# Patient Record
Sex: FEMALE | Race: WHITE | NOT HISPANIC OR LATINO | Employment: FULL TIME | URBAN - METROPOLITAN AREA
[De-identification: names, ages, dates, MRNs, and addresses within clinical notes are randomized per-mention and may not be internally consistent; named-entity substitution may affect disease eponyms.]

---

## 2017-09-19 ENCOUNTER — TELEPHONE (OUTPATIENT)
Dept: OBSTETRICS AND GYNECOLOGY | Age: 37
End: 2017-09-19

## 2017-09-19 NOTE — TELEPHONE ENCOUNTER
Pt called with rash on both buttocks and upper legs, no vaginal or perianal lesions noted. Pt called here for recommendations. No response with topical hydrocortisone. Pt is not pregnant. Recommend pt see primary or dermatologist. She does have a regular derm and will call them.

## 2017-09-29 ENCOUNTER — OFFICE VISIT (OUTPATIENT)
Dept: OBSTETRICS AND GYNECOLOGY | Age: 37
End: 2017-09-29

## 2017-09-29 VITALS
DIASTOLIC BLOOD PRESSURE: 62 MMHG | HEIGHT: 68 IN | BODY MASS INDEX: 21.82 KG/M2 | SYSTOLIC BLOOD PRESSURE: 102 MMHG | WEIGHT: 144 LBS

## 2017-09-29 DIAGNOSIS — Z13.29 SCREENING FOR THYROID DISORDER: ICD-10-CM

## 2017-09-29 DIAGNOSIS — Z13.0 SCREENING FOR DEFICIENCY ANEMIA: ICD-10-CM

## 2017-09-29 DIAGNOSIS — Z01.419 VISIT FOR GYNECOLOGIC EXAMINATION: Primary | ICD-10-CM

## 2017-09-29 DIAGNOSIS — B37.9 YEAST INFECTION: ICD-10-CM

## 2017-09-29 DIAGNOSIS — N92.0 MENORRHAGIA WITH REGULAR CYCLE: ICD-10-CM

## 2017-09-29 DIAGNOSIS — Z11.51 SCREENING FOR HPV (HUMAN PAPILLOMAVIRUS): ICD-10-CM

## 2017-09-29 PROCEDURE — 99395 PREV VISIT EST AGE 18-39: CPT | Performed by: OBSTETRICS & GYNECOLOGY

## 2017-09-29 RX ORDER — FLUCONAZOLE 150 MG/1
150 TABLET ORAL ONCE
Qty: 2 TABLET | Refills: 0 | Status: SHIPPED | OUTPATIENT
Start: 2017-09-29 | End: 2017-09-29

## 2017-09-29 NOTE — PROGRESS NOTES
Subjective     Chief Complaint   Patient presents with   • Annual Exam     no problems       History of Present Illness    Shabnam Harper is a 37 y.o.  who presents for annual exam.  Her menses are regular every 28-30 days, lasting 10 to 12 days, dysmenorrhea mild, occurring first 1-2 days of flow   Flow is heavy and longer than prior to last pregnancy, she had an episode of spotting between cycles last  but this has not continued  She also notes discharge and vaginal itching  Pt did see primary MD about the rash on her legs/buttocks, she treated with steroids and resolved, she is unsure of source of symptoms  Obstetric History:  OB History      Para Term  AB Living    4 4 3   4    SAB TAB Ectopic Multiple Live Births        4         Menstrual History:     Patient's last menstrual period was 2017.         Current contraception: tubal ligation  History of abnormal Pap smear: no  Received Gardasil immunization: no  Perform regular self breast exam: no  Family history of uterine or ovarian cancer: no  Family History of colon cancer: no  Family history of breast cancer: no    Mammogram: not indicated.  Colonoscopy: not indicated.  DEXA: not indicated.    Exercise: exercises 3 times a week  Calcium/Vitamin D: adequate intake    The following portions of the patient's history were reviewed and updated as appropriate: allergies, current medications, past family history, past medical history, past social history, past surgical history and problem list.    Review of Systems    Review of Systems   Constitutional: Negative for fatigue.   Respiratory: Negative for shortness of breath.    Gastrointestinal: Negative for abdominal pain.   Genitourinary: Negative for dysuria. positive for heavy menses with prolonged bleeding, positive for increased discharge with vaginal itching  Neurological: Negative for headaches.   Psychiatric/Behavioral: Negative for dysphoric mood.         Objective  "  Physical Exam    /62  Ht 68\" (172.7 cm)  Wt 144 lb (65.3 kg)  LMP 09/08/2017  BMI 21.9 kg/m2    General:   alert, appears stated age, cooperative and no distress   Neck: no asymmetry, masses, or scars   Heart: regular rate and rhythm, S1, S2 normal, no murmur, click, rub or gallop   Lungs: clear to auscultation bilaterally   Abdomen: soft, non-tender, without masses or organomegaly   Breast: inspection negative, no nipple discharge or bleeding, no masses or nodularity palpable and no axillary adenopathy   Vulva: normal, Bartholin's, Urethra, [place]'s normal   Vagina: normal mucosa, wet prep done, thick white discharge with small fragment of dark blood   Cervix: no lesions and pap done   Uterus: normal size, mobile, non-tender, normal shape and consistency   Adnexa: normal adnexa and no mass, fullness, tenderness   Rectal: not indicated     Wet prep: pH <5, budding hyphae seen, no clue or trich    Assessment/Plan   Shabnam was seen today for annual exam.    Diagnoses and all orders for this visit:    Visit for gynecologic examination  -     IGP, Apt HPV,rfx 16 / 18,45 - ThinPrep Vial, Cervix    Screening for HPV (human papillomavirus)  -     IGP, Apt HPV,rfx 16 / 18,45 - ThinPrep Vial, Cervix    Menorrhagia with regular cycle  -     Comprehensive Metabolic Panel  -     CBC & Differential    Screening for thyroid disorder  -     TSH    Screening for deficiency anemia  -     CBC & Differential    Yeast infection  -     fluconazole (DIFLUCAN) 150 MG tablet; Take 1 tablet by mouth 1 (One) Time for 1 dose. Repeat in 3 days if needed        All questions answered.  Breast self exam technique reviewed and patient encouraged to perform self-exam monthly.  Discussed healthy lifestyle modifications.  Recommended 30 minutes of aerobic exercise five times per week.    Pt counseled after visit by phone, wet prep shows yeast infection, she would like diflucan so this was sent to pharmacy via [facility] with instructions for " use.    Pt reports some of her friends have had endometrial ablation and that has helped with bleeding. Reviewed this is option but increased risk to procedure with hx of multiple  deliveries. Also discussed progestin treatment like IUD, Mirena ángel given.    Pt will f/u in approx 3 weeks for u/s and embx to evaluate further

## 2017-09-30 LAB
ALBUMIN SERPL-MCNC: 4.5 G/DL (ref 3.5–5.2)
ALBUMIN/GLOB SERPL: 1.8 G/DL
ALP SERPL-CCNC: 37 U/L (ref 39–117)
ALT SERPL-CCNC: 11 U/L (ref 1–33)
AST SERPL-CCNC: 10 U/L (ref 1–32)
BASOPHILS # BLD AUTO: 0.03 10*3/MM3 (ref 0–0.2)
BASOPHILS NFR BLD AUTO: 0.3 % (ref 0–1.5)
BILIRUB SERPL-MCNC: 0.3 MG/DL (ref 0.1–1.2)
BUN SERPL-MCNC: 10 MG/DL (ref 6–20)
BUN/CREAT SERPL: 12.2 (ref 7–25)
CALCIUM SERPL-MCNC: 9.4 MG/DL (ref 8.6–10.5)
CHLORIDE SERPL-SCNC: 102 MMOL/L (ref 98–107)
CO2 SERPL-SCNC: 28.4 MMOL/L (ref 22–29)
CREAT SERPL-MCNC: 0.82 MG/DL (ref 0.57–1)
EOSINOPHIL # BLD AUTO: 0.22 10*3/MM3 (ref 0–0.7)
EOSINOPHIL NFR BLD AUTO: 2.1 % (ref 0.3–6.2)
ERYTHROCYTE [DISTWIDTH] IN BLOOD BY AUTOMATED COUNT: 12.3 % (ref 11.7–13)
GLOBULIN SER CALC-MCNC: 2.5 GM/DL
GLUCOSE SERPL-MCNC: 87 MG/DL (ref 65–99)
HCT VFR BLD AUTO: 38.7 % (ref 35.6–45.5)
HGB BLD-MCNC: 12.7 G/DL (ref 11.9–15.5)
IMM GRANULOCYTES # BLD: 0.03 10*3/MM3 (ref 0–0.03)
IMM GRANULOCYTES NFR BLD: 0.3 % (ref 0–0.5)
LYMPHOCYTES # BLD AUTO: 2.8 10*3/MM3 (ref 0.9–4.8)
LYMPHOCYTES NFR BLD AUTO: 26.8 % (ref 19.6–45.3)
MCH RBC QN AUTO: 31.2 PG (ref 26.9–32)
MCHC RBC AUTO-ENTMCNC: 32.8 G/DL (ref 32.4–36.3)
MCV RBC AUTO: 95.1 FL (ref 80.5–98.2)
MONOCYTES # BLD AUTO: 0.54 10*3/MM3 (ref 0.2–1.2)
MONOCYTES NFR BLD AUTO: 5.2 % (ref 5–12)
NEUTROPHILS # BLD AUTO: 6.83 10*3/MM3 (ref 1.9–8.1)
NEUTROPHILS NFR BLD AUTO: 65.3 % (ref 42.7–76)
PLATELET # BLD AUTO: 325 10*3/MM3 (ref 140–500)
POTASSIUM SERPL-SCNC: 4.4 MMOL/L (ref 3.5–5.2)
PROT SERPL-MCNC: 7 G/DL (ref 6–8.5)
RBC # BLD AUTO: 4.07 10*6/MM3 (ref 3.9–5.2)
SODIUM SERPL-SCNC: 140 MMOL/L (ref 136–145)
TSH SERPL DL<=0.005 MIU/L-ACNC: 2.02 MIU/ML (ref 0.27–4.2)
WBC # BLD AUTO: 10.45 10*3/MM3 (ref 4.5–10.7)

## 2017-10-02 ENCOUNTER — TELEPHONE (OUTPATIENT)
Dept: OBSTETRICS AND GYNECOLOGY | Age: 37
End: 2017-10-02

## 2017-10-02 NOTE — TELEPHONE ENCOUNTER
----- Message from Jessica Kellogg MD sent at 10/1/2017  3:30 PM EDT -----  Reviewed bloodwork, alk phos minimally decreased, rest of cbc,cmp and thryoid labs normal, please call pt with results

## 2017-10-03 ENCOUNTER — TELEPHONE (OUTPATIENT)
Dept: OBSTETRICS AND GYNECOLOGY | Age: 37
End: 2017-10-03

## 2017-10-03 DIAGNOSIS — N39.0 URINARY TRACT INFECTION WITHOUT HEMATURIA, SITE UNSPECIFIED: Primary | ICD-10-CM

## 2017-10-03 RX ORDER — NITROFURANTOIN 25; 75 MG/1; MG/1
100 CAPSULE ORAL 2 TIMES DAILY
Qty: 10 CAPSULE | Refills: 0 | Status: SHIPPED | OUTPATIENT
Start: 2017-10-03 | End: 2017-10-08

## 2017-10-03 NOTE — TELEPHONE ENCOUNTER
Pt with UTI symptoms, she was treated for UTI at urgent care about 3 to 4 months ago but no symptoms since treatment until yesterday. She had cipro and symptoms improved. Now with similar feelings of low abdominal pain and urinary frequency. She denies fever or back pain. No dysuria.     She did take diflucan for yeast and that is improving.     Offered evaluation and urine culture but pt cannot come in. She desires to proceed with treatment now. Instructed on medication. Will use macrobid 100 mg twice daily for 5 days.Pt denies allergies

## 2017-10-03 NOTE — TELEPHONE ENCOUNTER
Pt was seen in office on 09-29-17. Pt states she was treated for a yeast infection at the appt, but now think she has a bladder infection. Pt states she is having frequency and central lower abdominal pain. Pt denies fever, burning, and blood in urine. Please advise.     KATIE  Pt#: [phone]  Pharm in chart

## 2017-10-05 LAB
CYTOLOGIST CVX/VAG CYTO: NORMAL
CYTOLOGY CVX/VAG DOC THIN PREP: NORMAL
DX ICD CODE: NORMAL
HIV 1 & 2 AB SER-IMP: NORMAL
HPV I/H RISK 4 DNA CVX QL PROBE+SIG AMP: NEGATIVE
OTHER STN SPEC: NORMAL
PATH REPORT.FINAL DX SPEC: NORMAL
STAT OF ADQ CVX/VAG CYTO-IMP: NORMAL

## 2017-10-09 ENCOUNTER — TELEPHONE (OUTPATIENT)
Dept: OBSTETRICS AND GYNECOLOGY | Age: 37
End: 2017-10-09

## 2017-10-09 NOTE — TELEPHONE ENCOUNTER
----- Message from Jessica Kellogg MD sent at 10/8/2017  7:46 AM EDT -----  Call pt, pap and hpv are negative

## 2017-10-23 ENCOUNTER — PROCEDURE VISIT (OUTPATIENT)
Dept: OBSTETRICS AND GYNECOLOGY | Age: 37
End: 2017-10-23

## 2017-10-23 ENCOUNTER — OFFICE VISIT (OUTPATIENT)
Dept: OBSTETRICS AND GYNECOLOGY | Age: 37
End: 2017-10-23

## 2017-10-23 VITALS
DIASTOLIC BLOOD PRESSURE: 62 MMHG | WEIGHT: 145 LBS | SYSTOLIC BLOOD PRESSURE: 110 MMHG | HEIGHT: 68 IN | BODY MASS INDEX: 21.98 KG/M2

## 2017-10-23 DIAGNOSIS — N92.0 MENORRHAGIA WITH REGULAR CYCLE: Primary | ICD-10-CM

## 2017-10-23 DIAGNOSIS — N92.0 MENORRHAGIA WITH REGULAR CYCLE: ICD-10-CM

## 2017-10-23 DIAGNOSIS — Z01.812 PRE-PROCEDURE LAB EXAM: Primary | ICD-10-CM

## 2017-10-23 LAB
B-HCG UR QL: NEGATIVE
INTERNAL NEGATIVE CONTROL: NEGATIVE
INTERNAL POSITIVE CONTROL: POSITIVE
Lab: NORMAL

## 2017-10-23 PROCEDURE — 81025 URINE PREGNANCY TEST: CPT | Performed by: OBSTETRICS & GYNECOLOGY

## 2017-10-23 PROCEDURE — 99212 OFFICE O/P EST SF 10 MIN: CPT | Performed by: OBSTETRICS & GYNECOLOGY

## 2017-10-23 PROCEDURE — 76830 TRANSVAGINAL US NON-OB: CPT | Performed by: OBSTETRICS & GYNECOLOGY

## 2017-10-23 PROCEDURE — 58100 BIOPSY OF UTERUS LINING: CPT | Performed by: OBSTETRICS & GYNECOLOGY

## 2017-10-23 NOTE — PROGRESS NOTES
"Chief complaint:menorrhagia     HPI  Shabnam Harper is a 37 y.o. female. Pt with increasing flow and cramping with cycles. No irregular bleeding.        The following portions of the patient's history were reviewed and updated as appropriate: allergies, current medications, past family history, past medical history, past social history, past surgical history and problem list.    Review of Systems  Pertinent items are noted in HPI.    /62  Ht 68\" (172.7 cm)  Wt 145 lb (65.8 kg)  LMP 10/14/2017  BMI 22.05 kg/m2    Objective   Physical Exam   Constitutional: She appears well-developed and well-nourished.     tv u/s: uterus with small fibroid on anterior wall, intramural, endometrium appears normal without focal lesions, 1.4 X 1.4 cm, normal ovaries with small follicles  Assessment/Plan   Shabnam was seen today for follow-up.    Diagnoses and all orders for this visit:    Pre-procedure lab exam  -     POC Pregnancy, Urine    Menorrhagia with regular cycle  -     Reference Histopathology - Tissue, Uterus      Endometrial Biopsy Procedure Note    Pre-operative Diagnosis: menorrhagea    Post-operative Diagnosis:menorrhagia    Indications: menorrhagia    Procedure Details    Urine pregnancy test was done today and result was negative.  The risks (including infection, bleeding, pain, and uterine perforation) and benefits of the procedure were explained to the patient and verbal and written informed consent was obtained.       The patient was placed in the dorsal lithotomy position. The speculum inserted in the vagina, and the cervix prepped with povidone iodine X 3.      A sharp tenaculum was applied to the anterior lip of the cervix for stabilization.  A sterile pipelle was used to sound the uterus to a depth of 8.5cm.  The Pipelle endometrial aspirator was used to sample the endometrium.  Sample was sent for pathologic examination.    Condition:  Stable    Complications:  None    Plan:    The patient was " advised to call for any fever or for prolonged or severe pain or bleeding.   Reviewed options again for management of bleeding including IUD or low dose pill. Ablation discussed but pt cautioned given history of 4  deliveries. Will call with biopsy and pt to consider options for management.

## 2017-10-25 LAB
DX ICD CODE: NORMAL
DX ICD CODE: NORMAL
PATH REPORT.FINAL DX SPEC: NORMAL
PATH REPORT.GROSS SPEC: NORMAL
PATH REPORT.RELEVANT HX SPEC: NORMAL
PATH REPORT.SITE OF ORIGIN SPEC: NORMAL
PATHOLOGIST NAME: NORMAL
PAYMENT PROCEDURE: NORMAL

## 2017-10-29 ENCOUNTER — TELEPHONE (OUTPATIENT)
Dept: OBSTETRICS AND GYNECOLOGY | Age: 37
End: 2017-10-29

## 2017-10-29 DIAGNOSIS — N92.0 MENORRHAGIA WITH REGULAR CYCLE: Primary | ICD-10-CM

## 2017-10-30 ENCOUNTER — TELEPHONE (OUTPATIENT)
Dept: OBSTETRICS AND GYNECOLOGY | Age: 37
End: 2017-10-30

## 2017-10-30 NOTE — TELEPHONE ENCOUNTER
----- Message from Jessica Kellogg MD sent at 10/29/2017  8:47 PM EDT -----  Called pt and left message to call for results  Inform pt, results benign but disordered proliferative endometrium suggests she may have irregular ovulation, usually this responds to progestin therapy

## 2017-10-30 NOTE — TELEPHONE ENCOUNTER
Pt called MD back after call and reviewed biopsy. Pt wants to try combined ocp's. She denies HTN or migraines or dvt/pe. Reviewed risks to include DVt/PE/CVE/HTN and gallbladder disease and pt desires. Sent rx via epic and reviewed instructions with pt.

## 2017-10-30 NOTE — TELEPHONE ENCOUNTER
"Called pt to review results, no answer, left message to call tomorrow for results, \"nothing urgent\" noted  "

## 2018-11-09 ENCOUNTER — TELEPHONE (OUTPATIENT)
Dept: OBSTETRICS AND GYNECOLOGY | Age: 38
End: 2018-11-09

## 2018-11-09 ENCOUNTER — OFFICE VISIT (OUTPATIENT)
Dept: OBSTETRICS AND GYNECOLOGY | Age: 38
End: 2018-11-09

## 2018-11-09 VITALS
BODY MASS INDEX: 22.73 KG/M2 | SYSTOLIC BLOOD PRESSURE: 120 MMHG | DIASTOLIC BLOOD PRESSURE: 74 MMHG | HEIGHT: 68 IN | WEIGHT: 150 LBS

## 2018-11-09 DIAGNOSIS — Z77.21 EXPOSURE TO BLOOD OR BODY FLUID: ICD-10-CM

## 2018-11-09 DIAGNOSIS — R35.0 URINARY FREQUENCY: Primary | ICD-10-CM

## 2018-11-09 LAB
BILIRUB BLD-MCNC: NEGATIVE MG/DL
CLARITY, POC: CLEAR
COLOR UR: YELLOW
GLUCOSE UR STRIP-MCNC: NEGATIVE MG/DL
KETONES UR QL: NEGATIVE
LEUKOCYTE EST, POC: NEGATIVE
NITRITE UR-MCNC: NEGATIVE MG/ML
PH UR: 6 [PH] (ref 5–8)
PROT UR STRIP-MCNC: NEGATIVE MG/DL
RBC # UR STRIP: ABNORMAL /UL
SP GR UR: 1.01 (ref 1–1.03)
UROBILINOGEN UR QL: NORMAL

## 2018-11-09 PROCEDURE — 99213 OFFICE O/P EST LOW 20 MIN: CPT | Performed by: NURSE PRACTITIONER

## 2018-11-09 PROCEDURE — 81002 URINALYSIS NONAUTO W/O SCOPE: CPT | Performed by: NURSE PRACTITIONER

## 2018-11-09 RX ORDER — CIPROFLOXACIN 250 MG/1
250 TABLET, FILM COATED ORAL 2 TIMES DAILY
Qty: 12 TABLET | Refills: 0 | Status: SHIPPED | OUTPATIENT
Start: 2018-11-09 | End: 2018-11-15

## 2018-11-09 RX ORDER — SULFAMETHOXAZOLE AND TRIMETHOPRIM 800; 160 MG/1; MG/1
1 TABLET ORAL
COMMUNITY
Start: 2018-11-05 | End: 2018-11-10

## 2018-11-09 NOTE — PROGRESS NOTES
"Subjective   Shabnam Harper is a 38 y.o. female is being seen today for   Chief Complaint   Patient presents with   • Urinary Frequency     Pt c/o urinary burning, frequency and urgency. Went to urgent care Monday and was given an antibiotic but doesn't feel any better.    .    History of Present Illness     Patient here with urinary burning, frequency and urgency.  She has also had a new partner and requests STD testing.  She was seen in an urgent care of Monday for possible UTI and was given Bactrim but symptoms are still there, although they are somewhat improved.  She has taken Cipro for UTIs in the past and symptoms were generally gone in a few days.  She denies any flank pain, nausea or fever.  It does not appear that they sent a urine culture on Monday and she has not received a follow up phone call from them since the visit. She denies any other vaginal symptoms, discharge, odor or itching.  She is on her period currently.    The following portions of the patient's history were reviewed and updated as appropriate: allergies, current medications, past family history, past medical history, past social history, past surgical history and problem list.    /74   Ht 172.7 cm (68\")   Wt 68 kg (150 lb)   LMP 11/05/2018   BMI 22.81 kg/m²         Review of Systems   Constitutional: Negative.  Negative for fever.   HENT: Negative.    Eyes: Negative.    Respiratory: Negative.    Cardiovascular: Negative.    Gastrointestinal: Negative.    Endocrine: Negative.    Genitourinary: Positive for dysuria, frequency, hematuria and urgency. Negative for flank pain, menstrual problem, vaginal discharge and vaginal pain.   Musculoskeletal: Negative.    Skin: Negative.    Allergic/Immunologic: Negative.    Neurological: Negative.    Hematological: Negative.    Psychiatric/Behavioral: Negative.        Objective   Physical Exam   Constitutional: She is oriented to person, place, and time. She appears well-developed and " well-nourished.   Genitourinary: Vagina normal and uterus normal. Uterus is not tender. Cervix exhibits no motion tenderness, no discharge and no friability.   Neurological: She is alert and oriented to person, place, and time.   Skin: Skin is warm and dry.   Psychiatric: She has a normal mood and affect.         Assessment/Plan   Shabnam was seen today for urinary frequency.    Diagnoses and all orders for this visit:    Urinary frequency  -     POC Urinalysis Dipstick  -     Urine Culture - Urine, Urine, Clean Catch  -     RPR  -     Hepatitis B surface antigen  -     Hepatitis C antibody  -     HIV-1 / O / 2 Ag / Antibody 4th Generation    Exposure to blood or body fluid  -     NuSwab VG+ - Swab, Vagina    Other orders  -     ciprofloxacin (CIPRO) 250 MG tablet; Take 1 tablet by mouth 2 (Two) Times a Day for 6 days.        Patient requests to change to Cipro (she has taken in the past with success).  Reviewed UA which is negative currently other than blood but she is on her cycle. Exam is normal.  Will send STD panel and urine culture.  She will call or seek further evaluation with increasing symptoms or fever.

## 2018-11-09 NOTE — TELEPHONE ENCOUNTER
Pt went to immediate care on Monday for bladder infection, was given antibiotic and doesn't feel better. Pt still c/o burning, frequency, urgency. Pt wants to be seen today. Pt said she is okay with NP or PA

## 2018-11-10 LAB
HBV SURFACE AG SERPL QL IA: NEGATIVE
HCV AB S/CO SERPL IA: 0.2 S/CO RATIO (ref 0–0.9)
HIV 1+2 AB+HIV1 P24 AG SERPL QL IA: NON REACTIVE
RPR SER QL: NORMAL

## 2018-11-11 LAB
BACTERIA UR CULT: NO GROWTH
BACTERIA UR CULT: NORMAL

## 2018-11-12 ENCOUNTER — TELEPHONE (OUTPATIENT)
Dept: OBSTETRICS AND GYNECOLOGY | Age: 38
End: 2018-11-12

## 2018-11-12 NOTE — PROGRESS NOTES
I have reviewed the notes, assessments, and/or procedures performed by HILARY Moreland, I concur with her/his documentation of Shabnam Harper.

## 2018-11-12 NOTE — TELEPHONE ENCOUNTER
----- Message from DANICA Basilio sent at 11/12/2018 12:29 PM EST -----  Let her know her serum std results are neg as is her urine culture

## 2018-11-14 LAB
A VAGINAE DNA VAG QL NAA+PROBE: NORMAL SCORE
BVAB2 DNA VAG QL NAA+PROBE: NORMAL SCORE
C ALBICANS DNA VAG QL NAA+PROBE: NEGATIVE
C GLABRATA DNA VAG QL NAA+PROBE: NEGATIVE
C TRACH RRNA SPEC QL NAA+PROBE: NEGATIVE
MEGA1 DNA VAG QL NAA+PROBE: NORMAL SCORE
N GONORRHOEA RRNA SPEC QL NAA+PROBE: NEGATIVE
T VAGINALIS RRNA SPEC QL NAA+PROBE: NEGATIVE

## 2019-04-03 ENCOUNTER — APPOINTMENT (OUTPATIENT)
Dept: GENERAL RADIOLOGY | Facility: HOSPITAL | Age: 39
End: 2019-04-03

## 2019-04-03 ENCOUNTER — HOSPITAL ENCOUNTER (EMERGENCY)
Facility: HOSPITAL | Age: 39
Discharge: HOME | End: 2019-04-03
Attending: EMERGENCY MEDICINE | Admitting: EMERGENCY MEDICINE

## 2019-04-03 VITALS
OXYGEN SATURATION: 100 % | BODY MASS INDEX: 21.82 KG/M2 | HEIGHT: 68 IN | SYSTOLIC BLOOD PRESSURE: 111 MMHG | RESPIRATION RATE: 16 BRPM | TEMPERATURE: 98.4 F | DIASTOLIC BLOOD PRESSURE: 92 MMHG | HEART RATE: 79 BPM | WEIGHT: 144 LBS

## 2019-04-03 DIAGNOSIS — S61.314A LACERATION OF RIGHT RING FINGER WITHOUT FOREIGN BODY WITH DAMAGE TO NAIL, INITIAL ENCOUNTER: Primary | ICD-10-CM

## 2019-04-03 PROCEDURE — 25010000002 TDAP 5-2.5-18.5 LF-MCG/0.5 SUSPENSION: Performed by: EMERGENCY MEDICINE

## 2019-04-03 PROCEDURE — 90715 TDAP VACCINE 7 YRS/> IM: CPT | Performed by: EMERGENCY MEDICINE

## 2019-04-03 PROCEDURE — 90471 IMMUNIZATION ADMIN: CPT | Performed by: EMERGENCY MEDICINE

## 2019-04-03 PROCEDURE — 73130 X-RAY EXAM OF HAND: CPT

## 2019-04-03 PROCEDURE — 99282 EMERGENCY DEPT VISIT SF MDM: CPT

## 2019-04-03 RX ADMIN — TETANUS TOXOID, REDUCED DIPHTHERIA TOXOID AND ACELLULAR PERTUSSIS VACCINE, ADSORBED 0.5 ML: 5; 2.5; 8; 8; 2.5 SUSPENSION INTRAMUSCULAR at 09:03

## 2019-04-03 NOTE — ED NOTES
2x2 and kerlex simple dressing applied to right ring finger     Cori Murillo RN  04/03/19 1020       Cori Murillo RN  04/03/19 1021

## 2019-04-03 NOTE — ED NOTES
Patient reports she slammed her right ring finger in her car door.     Charley Harding, RN  04/03/19 0806

## 2019-04-03 NOTE — ED PROVIDER NOTES
" EMERGENCY DEPARTMENT ENCOUNTER    CHIEF COMPLAINT  Chief Complaint: finger injury  History given by: patient  History limited by: none  Room Number:   PMD: Petey Boland MD      HPI:  Pt is a 38 y.o. female who presents complaining of injury to the 4th digit of her R hand that started earlier this morning when she slammed the finger in a car door. Pt states that her finger was numb on the way to the ER, but describes the pain as \"achey\" currently. Pt thinks that she slammed her entire finger in the car door, not just the fingertip. Pt states that her tetanus shot is not up to date.     Duration: since earlier this morning  Onset: sudden  Timing: brief  Location: 4th digit of the R hand  Radiation: none  Quality: finger injury  Intensity/Severity: moderate  Progression: unchanged  Associated Symptoms: none  Aggravating Factors: none  Alleviating Factors: none  Previous Episodes: none  Treatment before arrival: none    PAST MEDICAL HISTORY  Active Ambulatory Problems     Diagnosis Date Noted   • No Active Ambulatory Problems     Resolved Ambulatory Problems     Diagnosis Date Noted   • No Resolved Ambulatory Problems     Past Medical History:   Diagnosis Date   • ADHD (attention deficit hyperactivity disorder)        PAST SURGICAL HISTORY  Past Surgical History:   Procedure Laterality Date   •  SECTION     • TUBAL ABDOMINAL LIGATION         FAMILY HISTORY  Family History   Problem Relation Age of Onset   • Hypertension Mother    • Hypertension Maternal Grandmother    • Diabetes Maternal Grandfather    • Breast cancer Neg Hx    • Ovarian cancer Neg Hx    • Uterine cancer Neg Hx    • Colon cancer Neg Hx    • Melanoma Neg Hx    • Prostate cancer Neg Hx        SOCIAL HISTORY  Social History     Socioeconomic History   • Marital status:      Spouse name: Not on file   • Number of children: Not on file   • Years of education: Not on file   • Highest education level: Not on file   Tobacco Use   • " "Smoking status: Never Smoker   Substance and Sexual Activity   • Alcohol use: Yes     Comment: occ   • Drug use: No   • Sexual activity: Yes     Partners: Male     Birth control/protection: Surgical     Comment: Tubes removed       ALLERGIES  Patient has no known allergies.    REVIEW OF SYSTEMS  Review of Systems   Musculoskeletal:        Pt c/o injury and \"achey\" pain to the 4th digit of the R hand.    Neurological: Positive for numbness (injured finger was numb on the way to the ER ).     All other review of systems negative unless otherwise stated above.     PHYSICAL EXAM  ED Triage Vitals [04/03/19 0807]   Temp Heart Rate Resp BP SpO2   98.4 °F (36.9 °C) 79 16 -- 100 %      Temp src Heart Rate Source Patient Position BP Location FiO2 (%)   Tympanic -- -- -- --       Physical Exam   Constitutional: She is oriented to person, place, and time.  Non-toxic appearance. No distress.   Eyes: EOM are normal.   Neck: Normal range of motion.   Cardiovascular: Normal rate and regular rhythm.   Pulmonary/Chest: Effort normal and breath sounds normal. No respiratory distress.   Symmetric, non-labored breathing.   Musculoskeletal:   To the 4th digit of the R hand, there is a laceration to the distal fingertip just below the distal fingernail with the radial aspet having been displaced deep into the fingertip. Flexion and extension of the distal finger is intact. No active bleeding.    Neurological: She is alert and oriented to person, place, and time. She has normal sensation and normal strength.   Pt's sensation of the R 4th digit is intact.    Skin: Skin is warm and dry.   Psychiatric: Affect normal.   Nursing note and vitals reviewed.      RADIOLOGY  XR Hand 3+ View Right   Final Result   3 views of the right hand demonstrate no bony or articular abnormality.  There is no evidence of fracture or subluxation.        I ordered the above noted radiological studies. Interpreted by radiologist. Reviewed by me in PACS. "       PROCEDURES  Procedures      PROGRESS AND CONSULTS     0809 XR R Hand ordered for further evaluation.     0846 Tdap ordered for pt's laceration.     0945 I spoke with Dr. Arreola and discussed the pt's case. Dr. Arreola will see the pt in office.     1002 Rechecked pt. Pt is resting comfortably. Notified pt of results of XR R Hand (negative for fx). I told the pt that I spoke with Dr. Arreola and that he will see the pt in office immediately after leaving ED today. Discussed the plan to discharge the pt. I instructed the pt to follow up with Dr. Arreola in office. Pt understands and agrees with the plan, all questions answered.    MEDICAL DECISION MAKING  Results were reviewed/discussed with the patient and they were also made aware of online access. Pt also made aware that some labs, such as cultures, will not be resulted during ER visit and follow up with PMD is necessary.     MDM  Number of Diagnoses or Management Options  Laceration of right ring finger without foreign body with damage to nail, initial encounter:      Amount and/or Complexity of Data Reviewed  Tests in the radiology section of CPT®: reviewed and ordered (XR R Hand - negative acute)  Discuss the patient with other providers: yes (Dr. Arreola)  Independent visualization of images, tracings, or specimens: yes           DIAGNOSIS  Final diagnoses:   Laceration of right ring finger without foreign body with damage to nail, initial encounter       DISPOSITION  DISCHARGE    Patient discharged in stable condition.    Reviewed implications of results, diagnosis, meds, responsibility to follow up, warning signs and symptoms of possible worsening, potential complications and reasons to return to ER.    Patient/Family voiced understanding of above instructions.    Discussed plan for discharge, as there is no emergent indication for admission. Patient referred to primary care provider for BP management due to today's BP. Pt/family is agreeable and understands need for  follow up and repeat testing.  Pt is aware that discharge does not mean that nothing is wrong but it indicates no emergency is present that requires admission and they must continue care with follow-up as given below or physician of their choice.     FOLLOW-UP  No follow-up provider specified.       Medication List      No changes were made to your prescriptions during this visit.           Latest Documented Vital Signs:  As of 10:27 AM  BP- 111/92 HR- 79 Temp- 98.4 °F (36.9 °C) (Tympanic) O2 sat- 100%    --  Documentation assistance provided by amber Kilpatrick for Dr. Aguirre.  Information recorded by the scribe was done at my direction and has been verified and validated by me.                 Cj Kilpatrick  04/03/19 1031       Wayne Aguirre MD  04/04/19 9909

## 2019-04-03 NOTE — DISCHARGE INSTRUCTIONS
Dr. Arreola will see you in his office now. His address is:    [address] [place] [address], [address] [place] [id number] [phone]

## 2019-07-18 ENCOUNTER — OFFICE VISIT (OUTPATIENT)
Dept: OBSTETRICS AND GYNECOLOGY | Age: 39
End: 2019-07-18

## 2019-07-18 VITALS
BODY MASS INDEX: 22.43 KG/M2 | HEIGHT: 68 IN | WEIGHT: 148 LBS | DIASTOLIC BLOOD PRESSURE: 78 MMHG | SYSTOLIC BLOOD PRESSURE: 100 MMHG

## 2019-07-18 DIAGNOSIS — R39.9 UTI SYMPTOMS: Primary | ICD-10-CM

## 2019-07-18 DIAGNOSIS — Z77.21 EXPOSURE TO BLOOD OR BODY FLUID: ICD-10-CM

## 2019-07-18 DIAGNOSIS — N76.0 ACUTE VAGINITIS: ICD-10-CM

## 2019-07-18 LAB
BILIRUB BLD-MCNC: NEGATIVE MG/DL
GLUCOSE UR STRIP-MCNC: NEGATIVE MG/DL
KETONES UR QL: NEGATIVE
LEUKOCYTE EST, POC: NEGATIVE
NITRITE UR-MCNC: NEGATIVE MG/ML
PH UR: 7 [PH] (ref 5–8)
PROT UR STRIP-MCNC: NEGATIVE MG/DL
RBC # UR STRIP: ABNORMAL /UL
SP GR UR: 1.01 (ref 1–1.03)
UROBILINOGEN UR QL: NORMAL

## 2019-07-18 PROCEDURE — 99213 OFFICE O/P EST LOW 20 MIN: CPT | Performed by: NURSE PRACTITIONER

## 2019-07-18 PROCEDURE — 81002 URINALYSIS NONAUTO W/O SCOPE: CPT | Performed by: NURSE PRACTITIONER

## 2019-07-18 RX ORDER — SULFAMETHOXAZOLE AND TRIMETHOPRIM 800; 160 MG/1; MG/1
1 TABLET ORAL 2 TIMES DAILY
Qty: 6 TABLET | Refills: 0 | Status: SHIPPED | OUTPATIENT
Start: 2019-07-18 | End: 2019-11-25

## 2019-07-18 NOTE — PROGRESS NOTES
"Subjective   Shabnam Harper is a 38 y.o. female is being seen today for   Chief Complaint   Patient presents with   • Gynecologic Exam     PT HERE FOR POSSIBLE UTI OR YEAST INF (SEE U/A RESULTS). SHE IS OTHERWISE WELL.    .    History of Present Illness     Patient complaining of some lower abdominal \"burning\" and vaginal itching  She was in a bathing suit all weekend and in a hot tub and was concerned about a possible bladder or vaginal infection  She has a history of UTIs and seems similar but isn't having specific pain with urination, it is more constant  Some urination frequency as well  She would like STD testing as well, she has had a new partner. Declines serum testing  No back pain or fevers      The following portions of the patient's history were reviewed and updated as appropriate: allergies, current medications, past family history, past medical history, past social history, past surgical history and problem list.    /78   Ht 172.7 cm (68\")   Wt 67.1 kg (148 lb)   LMP 07/04/2019   BMI 22.50 kg/m²         Review of Systems   Constitutional: Negative.    HENT: Negative.    Eyes: Negative.    Respiratory: Negative.    Cardiovascular: Negative.    Gastrointestinal: Negative.    Endocrine: Negative.    Genitourinary: Positive for frequency, pelvic pain and vaginal pain. Negative for difficulty urinating, dysuria, vaginal bleeding and vaginal discharge.   Musculoskeletal: Negative.    Skin: Negative.    Allergic/Immunologic: Negative.    Neurological: Negative.    Hematological: Negative.    Psychiatric/Behavioral: Negative.        Objective   Physical Exam   Constitutional: She is oriented to person, place, and time. She appears well-developed and well-nourished.   Genitourinary: Vagina normal and uterus normal. Uterus is not tender. Cervix exhibits no motion tenderness, no discharge and no friability.   Genitourinary Comments: yellow, thin discharge noted.  No erythema.     Neurological: She is " alert and oriented to person, place, and time.   Skin: Skin is warm and dry.   Psychiatric: She has a normal mood and affect.         Assessment/Plan   Shabnam was seen today for gynecologic exam.    Diagnoses and all orders for this visit:    UTI symptoms  -     POC Urinalysis Dipstick  -     Urine Culture - Urine, Urine, Clean Catch  -     NuSwab VG+ - Swab, Vagina    Acute vaginitis  -     NuSwab VG+ - Swab, Vagina    Exposure to blood or body fluid    Other orders  -     sulfamethoxazole-trimethoprim (BACTRIM DS) 800-160 MG per tablet; Take 1 tablet by mouth 2 (Two) Times a Day.      Patient would like to start abx prior to weekend in case symptoms worsen.  We discussed going to urgent care if her symptoms drastically worsen or she develops a fever.  Will check vaginal culture and urine culture and call with results.  Encouraged condoms for STD protection.

## 2019-07-20 LAB
BACTERIA UR CULT: NORMAL
BACTERIA UR CULT: NORMAL

## 2019-11-12 ENCOUNTER — OFFICE VISIT (OUTPATIENT)
Dept: OBSTETRICS AND GYNECOLOGY | Age: 39
End: 2019-11-12

## 2019-11-12 VITALS
WEIGHT: 149.2 LBS | DIASTOLIC BLOOD PRESSURE: 60 MMHG | SYSTOLIC BLOOD PRESSURE: 102 MMHG | HEIGHT: 68 IN | BODY MASS INDEX: 22.61 KG/M2

## 2019-11-12 DIAGNOSIS — N94.9 VAGINAL BURNING: Primary | ICD-10-CM

## 2019-11-12 PROCEDURE — 99213 OFFICE O/P EST LOW 20 MIN: CPT | Performed by: OBSTETRICS & GYNECOLOGY

## 2019-11-12 RX ORDER — NITROFURANTOIN 25; 75 MG/1; MG/1
100 CAPSULE ORAL 2 TIMES DAILY
COMMUNITY
End: 2019-11-25

## 2019-11-12 NOTE — PROGRESS NOTES
"Chief complaint:     HPI  Shabnam Harper is a 39 y.o. female comes in for evaluation of possible UTI vs vaginitis. She has vaginal burning. She denies abnormal discharge but notes some discharge. She denies itching or odor. She went to urgent care and was treated with macrobid. They called her with results and her ucx and screens for CZ/GC and trich were negative as well. She also did 3 day monistat last week and did not note improvement in symptoms.        The following portions of the patient's history were reviewed and updated as appropriate: allergies, current medications, past family history, past medical history, past social history, past surgical history and problem list.    Review of Systems  Constitutional: positive for chills, negative for fevers  Gastrointestinal: negative  Genitourinary:pos for urinary frequency and dysuria, neg for vaginal bleeding, positive for vaginal burning    /60   Ht 172.7 cm (68\")   Wt 67.7 kg (149 lb 3.2 oz)   LMP 10/20/2019 (Exact Date)   Breastfeeding? No   BMI 22.69 kg/m²         Physical Exam   Constitutional: She is oriented to person, place, and time. She appears well-developed and well-nourished.   HENT:   Head: Normocephalic and atraumatic.   Pulmonary/Chest: Effort normal.   Abdominal: Soft. There is no tenderness.   Genitourinary:   Genitourinary Comments: Normal external female genitalia. No lesions noted.  Vagina with small white discharge, no focal lesions. Cervix without lesions or CMT. Uterine and adnexa without tenderness.   Neurological: She is alert and oriented to person, place, and time.   Skin: Skin is warm and dry.   Psychiatric: She has a normal mood and affect. Her behavior is normal.           Shabnam was seen today for follow-up.    Diagnoses and all orders for this visit:    Vaginal burning  -     NuSwab VG, HSV      Non-specific symptoms in patient with negative ucx and no response with course of macrobid and monistat. Will check " vaginitis swab with HSV. Discussed potential for HSV prodrome vs atypical yeast. Pt advised to call if symptoms worsen or with fever.     Pt due for annual. Will schedule next available.

## 2019-11-15 ENCOUNTER — TELEPHONE (OUTPATIENT)
Dept: OBSTETRICS AND GYNECOLOGY | Age: 39
End: 2019-11-15

## 2019-11-15 LAB
A VAGINAE DNA VAG QL NAA+PROBE: NORMAL SCORE
BVAB2 DNA VAG QL NAA+PROBE: NORMAL SCORE
C ALBICANS DNA VAG QL NAA+PROBE: NEGATIVE
C GLABRATA DNA VAG QL NAA+PROBE: NEGATIVE
HSV1 DNA SPEC QL NAA+PROBE: NEGATIVE
HSV2 DNA SPEC QL NAA+PROBE: NEGATIVE
MEGA1 DNA VAG QL NAA+PROBE: NORMAL SCORE
T VAGINALIS RRNA SPEC QL NAA+PROBE: NEGATIVE

## 2019-11-15 NOTE — TELEPHONE ENCOUNTER
----- Message from Jessica Kellogg MD sent at 11/15/2019  9:12 AM EST -----  Please call Shabnam, her vag swab is negative for BV, yeast, HSV and trich

## 2019-11-25 ENCOUNTER — OFFICE VISIT (OUTPATIENT)
Dept: OBSTETRICS AND GYNECOLOGY | Age: 39
End: 2019-11-25

## 2019-11-25 VITALS
SYSTOLIC BLOOD PRESSURE: 108 MMHG | DIASTOLIC BLOOD PRESSURE: 60 MMHG | BODY MASS INDEX: 23.22 KG/M2 | HEIGHT: 68 IN | WEIGHT: 153.2 LBS

## 2019-11-25 DIAGNOSIS — Z11.51 ENCOUNTER FOR SCREENING FOR HUMAN PAPILLOMAVIRUS (HPV): ICD-10-CM

## 2019-11-25 DIAGNOSIS — Z01.419 ENCOUNTER FOR GYNECOLOGICAL EXAMINATION: Primary | ICD-10-CM

## 2019-11-25 DIAGNOSIS — Z13.29 SCREENING FOR THYROID DISORDER: ICD-10-CM

## 2019-11-25 DIAGNOSIS — Z13.0 SCREENING FOR IRON DEFICIENCY ANEMIA: ICD-10-CM

## 2019-11-25 PROCEDURE — 99395 PREV VISIT EST AGE 18-39: CPT | Performed by: OBSTETRICS & GYNECOLOGY

## 2019-11-25 NOTE — PROGRESS NOTES
".  Subjective     Chief Complaint   Patient presents with   • Gynecologic Exam     AE today, last pap 2017 nml and HPV neg       History of Present Illness    Shabnam Harper is a 39 y.o.  who presents for annual exam.  Her menses are regular every 28-30 days, lasting up to 10 days with scant flow last few days,  Some cramping but not severe, flow is moderate to heavy  She notes her discharge resolved spontaneously after last visit    Obstetric History:  OB History      Para Term  AB Living    4 4 4     4    SAB TAB Ectopic Molar Multiple Live Births              4         Menstrual History:     Patient's last menstrual period was 10/15/2019.         Current contraception: tubal ligation  History of abnormal Pap smear: no  Received Gardasil immunization: no  Perform regular self breast exam: no  Family history of uterine or ovarian cancer: no  Family History of colon cancer: no  Family history of breast cancer: no    Mammogram: not indicated.  Colonoscopy: not indicated.  DEXA: not indicated.    Exercise: moderately active  Calcium/Vitamin D: adequate intake    The following portions of the patient's history were reviewed and updated as appropriate: allergies, current medications, past family history, past medical history, past social history, past surgical history and problem list.    Review of Systems    Review of Systems   Constitutional: Negative for fatigue.   Respiratory: Negative for shortness of breath.    Gastrointestinal: Negative for abdominal pain. negative for change in bowel habits  Genitourinary: Negative for dysuria. positive for moderate to heavy flow with menses, negative for irregular bleeding  Neurological: Negative for headaches.   Psychiatric/Behavioral: Negative for dysphoric mood.         Objective   Physical Exam    /60   Ht 172.7 cm (68\")   Wt 69.5 kg (153 lb 3.2 oz)   LMP 10/15/2019   Breastfeeding? No   BMI 23.29 kg/m²   General:   alert, comfortable " and no distress   Heart: regular rate and rhythm   Lungs: clear to auscultation bilaterally   Breast: normal with inspection; no masses, retractions, nipple discharge or axillary adenopathy   Neck: supple, symmetrical, trachea midline and no thyromegaly   Abdomen: Soft, nontender   CVA: Not performed today   Pelvis: External genitalia: normal general appearance  Vaginal: normal mucosa without prolapse or lesions  Cervix: normal appearance  Adnexa: normal bimanual exam and no tenderness  Uterus: normal and nontender   Extremities: Extremities normal, atraumatic, no cyanosis or edema   Neurologic: Alert and oriented   Psychiatric: Normal affect, judgement, and mood     Assessment/Plan   Shabnam was seen today for gynecologic exam.    Diagnoses and all orders for this visit:    Encounter for gynecological examination  -     IGP, Apt HPV,rfx 16 / 18,45    Encounter for screening for human papillomavirus (HPV)  -     IGP, Apt HPV,rfx 16 / 18,45    Screening for iron deficiency anemia  -     CBC & Differential    Screening for thyroid disorder  -     TSH        All questions answered.  Breast self exam technique reviewed and patient encouraged to perform self-exam monthly.  Discussed healthy lifestyle modifications.  Recommended 30 minutes of aerobic exercise five times per week.    Discussed management options for menstrual flow. Could do endometrial ablation. Discussed increased risk with hx C/S X 4. She understands. Discussed low dose combined ocp's vs POP vs progesterone IUD's. Pt will consider and information given. Will check cbc and tsh for further evaluation. Advised pt to call if she does not receive call with pap results within 2 weeks.

## 2019-11-26 LAB
BASOPHILS # BLD AUTO: 0.03 10*3/MM3 (ref 0–0.2)
BASOPHILS NFR BLD AUTO: 0.6 % (ref 0–1.5)
EOSINOPHIL # BLD AUTO: 0.14 10*3/MM3 (ref 0–0.4)
EOSINOPHIL NFR BLD AUTO: 2.9 % (ref 0.3–6.2)
ERYTHROCYTE [DISTWIDTH] IN BLOOD BY AUTOMATED COUNT: 11.8 % (ref 12.3–15.4)
HCT VFR BLD AUTO: 35 % (ref 34–46.6)
HGB BLD-MCNC: 12.1 G/DL (ref 12–15.9)
IMM GRANULOCYTES # BLD AUTO: 0.01 10*3/MM3 (ref 0–0.05)
IMM GRANULOCYTES NFR BLD AUTO: 0.2 % (ref 0–0.5)
LYMPHOCYTES # BLD AUTO: 1.46 10*3/MM3 (ref 0.7–3.1)
LYMPHOCYTES NFR BLD AUTO: 29.9 % (ref 19.6–45.3)
MCH RBC QN AUTO: 32.2 PG (ref 26.6–33)
MCHC RBC AUTO-ENTMCNC: 34.6 G/DL (ref 31.5–35.7)
MCV RBC AUTO: 93.1 FL (ref 79–97)
MONOCYTES # BLD AUTO: 0.38 10*3/MM3 (ref 0.1–0.9)
MONOCYTES NFR BLD AUTO: 7.8 % (ref 5–12)
NEUTROPHILS # BLD AUTO: 2.86 10*3/MM3 (ref 1.7–7)
NEUTROPHILS NFR BLD AUTO: 58.6 % (ref 42.7–76)
NRBC BLD AUTO-RTO: 0 /100 WBC (ref 0–0.2)
PLATELET # BLD AUTO: 310 10*3/MM3 (ref 140–450)
RBC # BLD AUTO: 3.76 10*6/MM3 (ref 3.77–5.28)
TSH SERPL DL<=0.005 MIU/L-ACNC: 2.93 UIU/ML (ref 0.27–4.2)
WBC # BLD AUTO: 4.88 10*3/MM3 (ref 3.4–10.8)

## 2019-11-27 LAB
CYTOLOGIST CVX/VAG CYTO: NORMAL
CYTOLOGY CVX/VAG DOC CYTO: NORMAL
CYTOLOGY CVX/VAG DOC THIN PREP: NORMAL
DX ICD CODE: NORMAL
HIV 1 & 2 AB SER-IMP: NORMAL
HPV I/H RISK 4 DNA CVX QL PROBE+SIG AMP: NEGATIVE
OTHER STN SPEC: NORMAL
STAT OF ADQ CVX/VAG CYTO-IMP: NORMAL

## 2019-12-02 ENCOUNTER — TELEPHONE (OUTPATIENT)
Dept: OBSTETRICS AND GYNECOLOGY | Age: 39
End: 2019-12-02

## 2019-12-02 NOTE — TELEPHONE ENCOUNTER
----- Message from Jessica Kellogg MD sent at 12/2/2019  7:42 AM EST -----  Please call Shabnam, her pap and hpv are negative/normal

## 2020-05-07 ENCOUNTER — APPOINTMENT (OUTPATIENT)
Dept: GENERAL RADIOLOGY | Facility: HOSPITAL | Age: 40
End: 2020-05-07

## 2020-05-07 PROCEDURE — 72050 X-RAY EXAM NECK SPINE 4/5VWS: CPT | Performed by: FAMILY MEDICINE

## 2020-05-07 PROCEDURE — 72072 X-RAY EXAM THORAC SPINE 3VWS: CPT | Performed by: FAMILY MEDICINE

## 2021-04-16 ENCOUNTER — BULK ORDERING (OUTPATIENT)
Dept: CASE MANAGEMENT | Facility: OTHER | Age: 41
End: 2021-04-16

## 2021-04-16 DIAGNOSIS — Z23 IMMUNIZATION DUE: ICD-10-CM

## 2022-08-15 ENCOUNTER — OFFICE VISIT (OUTPATIENT)
Dept: OBSTETRICS AND GYNECOLOGY | Age: 42
End: 2022-08-15

## 2022-08-15 VITALS
BODY MASS INDEX: 25.13 KG/M2 | WEIGHT: 165.8 LBS | HEIGHT: 68 IN | DIASTOLIC BLOOD PRESSURE: 64 MMHG | SYSTOLIC BLOOD PRESSURE: 108 MMHG

## 2022-08-15 DIAGNOSIS — Z12.31 ENCOUNTER FOR SCREENING MAMMOGRAM FOR MALIGNANT NEOPLASM OF BREAST: ICD-10-CM

## 2022-08-15 DIAGNOSIS — Z01.419 ENCOUNTER FOR GYNECOLOGICAL EXAMINATION: Primary | ICD-10-CM

## 2022-08-15 DIAGNOSIS — N89.8 VAGINAL DISCHARGE: ICD-10-CM

## 2022-08-15 DIAGNOSIS — Z11.51 ENCOUNTER FOR SCREENING FOR HUMAN PAPILLOMAVIRUS (HPV): ICD-10-CM

## 2022-08-15 DIAGNOSIS — N92.0 MENORRHAGIA WITH REGULAR CYCLE: ICD-10-CM

## 2022-08-15 PROCEDURE — 99396 PREV VISIT EST AGE 40-64: CPT | Performed by: OBSTETRICS & GYNECOLOGY

## 2022-08-15 NOTE — PROGRESS NOTES
.  Subjective     Chief Complaint   Patient presents with   • Gynecologic Exam     Annual exam, Last Pap 2019 NEG, HPV NEG, Pt C/O increase in discharge with mild odor        History of Present Illness    Shabnam Harper is a 41 y.o.  who presents for annual exam.  Her menses are regular every 28-30 days, lasting 4-7 days, dysmenorrhea mild, occurring first 1-2 days of flow ; she feels like flow is heavier some months with more cramping  She also notes some vaginal discharge with odor for the last few weeks. She is sexually active in a long term relationship currently.     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 She is  and doing well with this. They share custody and kids are doing well.     Obstetric History:  OB History        4    Para   4    Term   4            AB        Living   4       SAB        IAB        Ectopic        Molar        Multiple        Live Births   4               Menstrual History:     Patient's last menstrual period was 2022 (approximate).         Current contraception: tubal ligation  History of abnormal Pap smear: no  Received Gardasil immunization: no  Perform regular self breast exam: yes - occ  Family history of uterine or ovarian cancer: no  Family History of colon cancer: no  Family history of breast cancer: no    Mammogram: ordered.  Colonoscopy: not indicated.  DEXA: not indicated.    Exercise: moderately active  Calcium/Vitamin D: adequate intake    The following portions of the patient's history were reviewed and updated as appropriate: allergies, current medications, past family history, past medical history, past social history, past surgical history and problem list.    Review of Systems    Review of Systems   Constitutional: Negative for fatigue.   Respiratory: Negative for shortness of breath.    Gastrointestinal: Negative for abdominal pain.   Genitourinary: Positive for heavy flow with more cramping, pos for  "discharge with odor;Negative for irregular bleeding  Neurological: Negative for headaches.   Psychiatric/Behavioral: Negative for dysphoric mood.         Objective   Physical Exam    /64   Ht 172.7 cm (68\")   Wt 75.2 kg (165 lb 12.8 oz)   LMP 07/17/2022 (Approximate)   Breastfeeding No   BMI 25.21 kg/m²   General:   Alert, in no distress   Heart: regular rate and rhythm   Lungs: clear to auscultation bilaterally   Breast: Inspection negative; no masses, retractions, nipple discharge or axillary adenopathy in either breast   Neck: Supple, no thyromegaly   Abdomen: Soft, no tenderness or guarding   Pelvis: External genitalia: normal general appearance  Urinary system: urethral meatus normal  Vaginal: normal mucosa without prolapse or lesions  Cervix: normal appearance  Adnexa: no masses or tenderness  Uterus: normal, nontender   Extremities: Normal without edema   Neurologic: Alert and oriented   Psychiatric: Normal affect, judgment and mood     Assessment & Plan   Diagnoses and all orders for this visit:    1. Encounter for gynecological examination (Primary)  -     IGP, Apt HPV,rfx 16 / 18,45    2. Encounter for screening for human papillomavirus (HPV)  -     IGP, Apt HPV,rfx 16 / 18,45    3. Vaginal discharge  -     NuSwab VG+ - Swab, Vagina    4. Encounter for screening mammogram for malignant neoplasm of breast  -     Mammo Screening Digital Tomosynthesis Bilateral With CAD; Future    5. Menorrhagia with regular cycle  -     CBC & Differential  -     TSH        All questions answered.  Breast self exam technique reviewed and patient encouraged to perform self-exam monthly.  Discussed healthy lifestyle modifications.  Recommended 30 minutes of aerobic exercise five times per week.  Advised pt to call if she does not receive results of pap and labs within 2 weeks  Recommend she book mammogram asap, tech is not available today for work-in.     Discussed options for management of menorrhagia. She is " interested in endometrial ablation. Reviewed possible increased risk of injury to adjacent organs given multiple cesareans and last .  She notes understanding and will consider options. Recommend she book biopsy and u/s for further evaluation at this time. Discussed medical options as well as hysterectomy. Risks and benefits discussed. Pt scheduled f/u visit .

## 2022-08-16 LAB
BASOPHILS # BLD AUTO: 0 X10E3/UL (ref 0–0.2)
BASOPHILS NFR BLD AUTO: 1 %
EOSINOPHIL # BLD AUTO: 0.1 X10E3/UL (ref 0–0.4)
EOSINOPHIL NFR BLD AUTO: 2 %
ERYTHROCYTE [DISTWIDTH] IN BLOOD BY AUTOMATED COUNT: 12.4 % (ref 11.7–15.4)
HCT VFR BLD AUTO: 38.2 % (ref 34–46.6)
HGB BLD-MCNC: 12.9 G/DL (ref 11.1–15.9)
IMM GRANULOCYTES # BLD AUTO: 0 X10E3/UL (ref 0–0.1)
IMM GRANULOCYTES NFR BLD AUTO: 0 %
LYMPHOCYTES # BLD AUTO: 1.5 X10E3/UL (ref 0.7–3.1)
LYMPHOCYTES NFR BLD AUTO: 26 %
MCH RBC QN AUTO: 31.3 PG (ref 26.6–33)
MCHC RBC AUTO-ENTMCNC: 33.8 G/DL (ref 31.5–35.7)
MCV RBC AUTO: 93 FL (ref 79–97)
MONOCYTES # BLD AUTO: 0.5 X10E3/UL (ref 0.1–0.9)
MONOCYTES NFR BLD AUTO: 8 %
NEUTROPHILS # BLD AUTO: 3.7 X10E3/UL (ref 1.4–7)
NEUTROPHILS NFR BLD AUTO: 63 %
PLATELET # BLD AUTO: 254 X10E3/UL (ref 150–450)
RBC # BLD AUTO: 4.12 X10E6/UL (ref 3.77–5.28)
TSH SERPL DL<=0.005 MIU/L-ACNC: 2.67 UIU/ML (ref 0.45–4.5)
WBC # BLD AUTO: 5.9 X10E3/UL (ref 3.4–10.8)

## 2022-08-17 LAB
A VAGINAE DNA VAG QL NAA+PROBE: NORMAL SCORE
BVAB2 DNA VAG QL NAA+PROBE: NORMAL SCORE
C ALBICANS DNA VAG QL NAA+PROBE: NEGATIVE
C GLABRATA DNA VAG QL NAA+PROBE: NEGATIVE
C TRACH DNA VAG QL NAA+PROBE: NEGATIVE
MEGA1 DNA VAG QL NAA+PROBE: NORMAL SCORE
N GONORRHOEA DNA VAG QL NAA+PROBE: NEGATIVE
T VAGINALIS DNA VAG QL NAA+PROBE: NEGATIVE

## 2022-08-21 NOTE — PROGRESS NOTES
"Chief Complaint   Patient presents with   • Follow-up     GYN F/U for Endometrial Biopsy, U/S today, Pt has no complaints today         HPI  Shabnam Harper is a 41 y.o. female presents for u/s and endometrial biopsy. She complains of heavy menses.        The following portions of the patient's history were reviewed and updated as appropriate: allergies, current medications, past family history, past medical history, past social history, past surgical history and problem list.    Review of Systems  Pertinent items are noted in HPI.    /60   Ht 172.7 cm (68\")   Wt 74.9 kg (165 lb 3.2 oz)   LMP 08/22/2022 (Exact Date)   Breastfeeding No   BMI 25.12 kg/m²         Physical Exam  Constitutional:       Appearance: Normal appearance.   Genitourinary:     Comments: Normal vagina. Cervix with light menstrual flow. No lesions noted.  Neurological:      General: No focal deficit present.      Mental Status: She is alert and oriented to person, place, and time.   Psychiatric:         Mood and Affect: Mood normal.         Behavior: Behavior normal.     tv u/s: uterus with 4 small fibroids, no focal endometrial lesions, normal ovaries.        Diagnoses and all orders for this visit:    1. Menorrhagia with regular cycle (Primary)    2. Encounter for biopsy  -     POC Pregnancy, Urine      Discussed options with pt. She is aware endometrial ablation would be an option if biopsy is benign and without hyperplasia however, she understands increased risk of complication given 4 prior cesareans. Also discussed increased failure of ablation with fibroids as noted on u/s today. Discussed progestin therapy. Pt will consider. Advised her to call if she does not receive results of embx within 10 days.            .Endometrial Biopsy Procedure Note    Pre-operative Diagnosis: menorrhagia    Post-operative Diagnosis: same    Indications: menorrhagia    Procedure Details    Urine pregnancy test was done and was NEGATIVE .  The risks " (including infection, bleeding, pain, and uterine perforation) and benefits of the procedure were explained to the patient and verbal and written informed consent was obtained.        The patient was placed in the dorsal lithotomy position.  A speculum inserted in the vagina, and the cervix prepped with povidone iodine X 3.      A sharp tenaculum was applied to the anterior lip of the cervix for stabilization.  A Pipelle was used to sound the uterus to a depth of 9cm and sample the endometrium using sterile technique.  Sample was sent for pathologic examination.    Condition:  Stable    Complications:  None  Patient tolerated the procedure well without complications.    Plan:    The patient was advised to call for any fever or for prolonged or severe pain or bleeding.

## 2022-08-23 ENCOUNTER — OFFICE VISIT (OUTPATIENT)
Dept: OBSTETRICS AND GYNECOLOGY | Age: 42
End: 2022-08-23

## 2022-08-23 VITALS
BODY MASS INDEX: 25.04 KG/M2 | SYSTOLIC BLOOD PRESSURE: 104 MMHG | WEIGHT: 165.2 LBS | HEIGHT: 68 IN | DIASTOLIC BLOOD PRESSURE: 60 MMHG

## 2022-08-23 DIAGNOSIS — Z76.89 ENCOUNTER FOR BIOPSY: ICD-10-CM

## 2022-08-23 DIAGNOSIS — N92.0 MENORRHAGIA WITH REGULAR CYCLE: Primary | ICD-10-CM

## 2022-08-23 LAB
B-HCG UR QL: NEGATIVE
EXPIRATION DATE: NORMAL
INTERNAL NEGATIVE CONTROL: NEGATIVE
INTERNAL POSITIVE CONTROL: POSITIVE
Lab: NORMAL

## 2022-08-23 PROCEDURE — 81025 URINE PREGNANCY TEST: CPT | Performed by: OBSTETRICS & GYNECOLOGY

## 2022-08-23 PROCEDURE — 99212 OFFICE O/P EST SF 10 MIN: CPT | Performed by: OBSTETRICS & GYNECOLOGY

## 2022-08-23 PROCEDURE — 58100 BIOPSY OF UTERUS LINING: CPT | Performed by: OBSTETRICS & GYNECOLOGY

## 2022-08-25 ENCOUNTER — APPOINTMENT (OUTPATIENT)
Dept: WOMENS IMAGING | Facility: HOSPITAL | Age: 42
End: 2022-08-25

## 2022-08-25 LAB
DX ICD CODE: NORMAL
PATH REPORT.FINAL DX SPEC: NORMAL
PATH REPORT.GROSS SPEC: NORMAL
PATH REPORT.SITE OF ORIGIN SPEC: NORMAL
PATHOLOGIST NAME: NORMAL
PAYMENT PROCEDURE: NORMAL

## 2022-08-25 PROCEDURE — 77067 SCR MAMMO BI INCL CAD: CPT | Performed by: RADIOLOGY

## 2022-08-25 PROCEDURE — 77063 BREAST TOMOSYNTHESIS BI: CPT | Performed by: RADIOLOGY

## 2022-08-29 NOTE — PROGRESS NOTES
Pt notified of results, patient would like to schedule a follow appointment to discuss options further

## 2023-08-22 ENCOUNTER — OFFICE VISIT (OUTPATIENT)
Dept: OBSTETRICS AND GYNECOLOGY | Age: 43
End: 2023-08-22
Payer: COMMERCIAL

## 2023-08-22 VITALS
SYSTOLIC BLOOD PRESSURE: 118 MMHG | HEIGHT: 68 IN | WEIGHT: 162 LBS | DIASTOLIC BLOOD PRESSURE: 64 MMHG | BODY MASS INDEX: 24.55 KG/M2

## 2023-08-22 DIAGNOSIS — F41.9 ANXIETY: ICD-10-CM

## 2023-08-22 DIAGNOSIS — Z12.31 ENCOUNTER FOR SCREENING MAMMOGRAM FOR MALIGNANT NEOPLASM OF BREAST: ICD-10-CM

## 2023-08-22 DIAGNOSIS — Z01.419 ENCOUNTER FOR GYNECOLOGICAL EXAMINATION: Primary | ICD-10-CM

## 2023-08-22 RX ORDER — ESCITALOPRAM OXALATE 10 MG/1
10 TABLET ORAL DAILY
Qty: 30 TABLET | Refills: 2 | Status: SHIPPED | OUTPATIENT
Start: 2023-08-22 | End: 2024-08-21

## 2023-08-22 NOTE — PROGRESS NOTES
".Subjective     Chief Complaint   Patient presents with    Gynecologic Exam     Annual exam, Last Pap 08/15/2022 NEG, HPV NEG, Last Mammogram 2022, pt has no complaints today, Doing well        History of Present Illness    Shabnam Harper is a 42 y.o.  who presents for annual exam.  Her menses are regular every 28-30 days, lasting 7 days; dysmenorrhea none     She has noticed more labile mood and crying spells lately. She overall feels anxious. Her symptoms occur throughout the month.     Kids are 8 to 13 yo and all doing well.   Obstetric History:  OB History          4    Para   4    Term   4            AB        Living   4         SAB        IAB        Ectopic        Molar        Multiple        Live Births   4               Menstrual History:     Patient's last menstrual period was 2023 (exact date).         Current contraception: tubal ligation  History of abnormal Pap smear: no  Received Gardasil immunization: no  Perform regular self breast exam:  yes  Family history of uterine or ovarian cancer: no  Family History of colon cancer: no  Family history of breast cancer: no    Mammogram: ordered.  Colonoscopy: not indicated.  DEXA: not indicated.    Exercise: moderately active  Calcium/Vitamin D: adequate intake    The following portions of the patient's history were reviewed and updated as appropriate: allergies, current medications, past family history, past medical history, past social history, past surgical history, and problem list.    Review of Systems    Review of Systems   Constitutional: Negative for fatigue.   Respiratory: Negative for shortness of breath.    Gastrointestinal: Negative for abdominal pain.   Genitourinary: Negative for excessive or abnormal bleeding  Neurological: Negative for headaches.   Psychiatric/Behavioral: Positive for emotional lability and worsening anxiety        Objective   Physical Exam    /64   Ht 172.7 cm (68\")   Wt 73.5 kg (162 " lb)   LMP 08/05/2023 (Exact Date)   BMI 24.63 kg/mý   General:   Alert, in no distress   Heart: regular rate and rhythm   Lungs: clear to auscultation bilaterally   Breast: Inspection negative; no masses, retractions, nipple discharge or axillary adenopathy in either breast   Neck: Supple, no thyromegaly   Abdomen: Soft, no tenderness or guarding   Pelvis: External genitalia: normal general appearance  Urinary system: urethral meatus normal  Vaginal: normal mucosa without prolapse or lesions  Cervix: normal appearance  Adnexa: no masses or tenderness  Uterus: normal, nontender   Extremities: Normal without edema   Neurologic: Alert and oriented   Psychiatric: Normal affect, judgment and mood     Assessment & Plan   Diagnoses and all orders for this visit:    1. Encounter for gynecological examination (Primary)    2. Encounter for screening mammogram for malignant neoplasm of breast  -     Mammo Screening Digital Tomosynthesis Bilateral With CAD; Future    3. Anxiety    Other orders  -     escitalopram (Lexapro) 10 MG tablet; Take 1 tablet by mouth Daily.  Dispense: 30 tablet; Refill: 2        All questions answered.  Breast self exam technique reviewed and patient encouraged to perform self-exam monthly.  Discussed healthy lifestyle modifications.  Recommended 30 minutes of aerobic exercise five times per week.  Pap def as up to date and pt agrees  Ordered mammogram due later this month and pt agrees to book    Pt notes worsening anxiety/mood lability. Denies SI/HI. Reviewed option of medical treatment and risks / benefits reviewed. Pt denies any contraindications. Rx lexapro with instructions. F/u 6 weeks or prn.

## 2023-09-01 ENCOUNTER — APPOINTMENT (OUTPATIENT)
Dept: WOMENS IMAGING | Facility: HOSPITAL | Age: 43
End: 2023-09-01
Payer: COMMERCIAL

## 2023-09-01 PROCEDURE — 77067 SCR MAMMO BI INCL CAD: CPT | Performed by: RADIOLOGY

## 2023-09-01 PROCEDURE — 77063 BREAST TOMOSYNTHESIS BI: CPT | Performed by: RADIOLOGY

## 2023-10-05 NOTE — PROGRESS NOTES
"Chief Complaint   Patient presents with    Follow-up     GYN F/U for recheck, Pt has no complaints today, Doing well         HPI  Shabnam Harper is a 43 y.o. female is scheduled for f/u after lexapro prescribed for anxiety at her last visit. She denies any issues with medication except for mild fatigue that seems better. She feels her mood is overall better and she has less depression symptoms. She still has some issues with waking in the middle of the night.         The following portions of the patient's history were reviewed and updated as appropriate: allergies, current medications, past family history, past medical history, past social history, past surgical history, and problem list.    Review of Systems  Pertinent items are noted in HPI.    /66   Ht 172.7 cm (68\")   Wt 73.8 kg (162 lb 12.8 oz)   LMP 09/29/2023 (Exact Date)   BMI 24.75 kg/m²         Physical Exam  Constitutional:       Appearance: Normal appearance.   Pulmonary:      Effort: Pulmonary effort is normal.   Neurological:      General: No focal deficit present.      Mental Status: She is alert and oriented to person, place, and time.   Psychiatric:         Mood and Affect: Mood normal.         Behavior: Behavior normal.         Thought Content: Thought content normal.           Diagnoses and all orders for this visit:    1. Anxiety (Primary)      Will continue lexapro and ptagrees, plan f/u 3 months. Reviewed some alternative options to address sleep.           "

## 2023-10-06 ENCOUNTER — OFFICE VISIT (OUTPATIENT)
Dept: OBSTETRICS AND GYNECOLOGY | Age: 43
End: 2023-10-06
Payer: COMMERCIAL

## 2023-10-06 VITALS
HEIGHT: 68 IN | DIASTOLIC BLOOD PRESSURE: 66 MMHG | SYSTOLIC BLOOD PRESSURE: 104 MMHG | WEIGHT: 162.8 LBS | BODY MASS INDEX: 24.67 KG/M2

## 2023-10-06 DIAGNOSIS — F41.9 ANXIETY: Primary | ICD-10-CM

## 2023-11-16 RX ORDER — ESCITALOPRAM OXALATE 10 MG/1
10 TABLET ORAL DAILY
Qty: 30 TABLET | Refills: 2 | Status: SHIPPED | OUTPATIENT
Start: 2023-11-16 | End: 2024-11-15